# Patient Record
Sex: FEMALE | Race: WHITE | NOT HISPANIC OR LATINO | URBAN - METROPOLITAN AREA
[De-identification: names, ages, dates, MRNs, and addresses within clinical notes are randomized per-mention and may not be internally consistent; named-entity substitution may affect disease eponyms.]

---

## 2018-03-25 ENCOUNTER — EMERGENCY (EMERGENCY)
Facility: HOSPITAL | Age: 53
LOS: 1 days | Discharge: HOME | End: 2018-03-25
Attending: EMERGENCY MEDICINE | Admitting: EMERGENCY MEDICINE
Payer: COMMERCIAL

## 2018-03-25 VITALS
OXYGEN SATURATION: 100 % | HEART RATE: 72 BPM | RESPIRATION RATE: 16 BRPM | SYSTOLIC BLOOD PRESSURE: 125 MMHG | DIASTOLIC BLOOD PRESSURE: 85 MMHG | WEIGHT: 134.92 LBS | TEMPERATURE: 98 F

## 2018-03-25 DIAGNOSIS — Z98.890 OTHER SPECIFIED POSTPROCEDURAL STATES: Chronic | ICD-10-CM

## 2018-03-25 PROCEDURE — 99282 EMERGENCY DEPT VISIT SF MDM: CPT

## 2018-03-25 NOTE — ED ADULT TRIAGE NOTE - CHIEF COMPLAINT QUOTE
Patient c/o foreign body in throat with associated throat pain, worsening when swallowing. States she was eating popcorn Wednesday, had a popcorn stuck in throat. Has tried multiple manoeuvres to retrieve popcorn particle without relief. Denies SOB, trouble breathing, N/V, drooling. Airway patent, speaking in full sentences.

## 2018-03-25 NOTE — ED PROVIDER NOTE - OBJECTIVE STATEMENT
53 y/o F s/p tonsillectomy presents w/ foreign body in throat x 1 week after eating popcorn. Patient self examined her throat with no foreign body seen, though every time she swallows, patient has a sensation that a popcorn kernel is in her throat. Patient denies fever, chills, difficulty swallowing, difficulty breathing, drooling, or any other complaints.

## 2018-03-25 NOTE — ED ADULT NURSE NOTE - OBJECTIVE STATEMENT
throat pain x 5 days, thinks a piece of popcorn is tuck in throat, no resp distress noted. awaiting MD cameron

## 2018-03-25 NOTE — ED PROVIDER NOTE - ENMT, MLM
Airway patent, Nasal mucosa clear. Mouth with normal mucosa. Throat has no vesicles, no oropharyngeal exudates and uvula is midline. Airway patent, Nasal mucosa clear. Mouth with normal mucosa. No foreign body visualized, pharynx symmetric

## 2018-03-25 NOTE — ED PROVIDER NOTE - MEDICAL DECISION MAKING DETAILS
Patient had pop corn on Weds and feels fb in L pharynx since then, no SOB, no trouble swallowing.  On direct laryngoscopy no fb noted.  Pt referred to ENT.
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
## 2018-03-29 DIAGNOSIS — T17.208A UNSPECIFIED FOREIGN BODY IN PHARYNX CAUSING OTHER INJURY, INITIAL ENCOUNTER: ICD-10-CM
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
## 2018-07-26 NOTE — ED ADULT TRIAGE NOTE - TEMPERATURE IN CELSIUS (DEGREES C)
----- Message from Viraj Grijalva MD sent at 5/16/2017 12:58 PM CDT -----  Positive for lung nodules as above, likely benign, refer patient to pulmonary medicine for further evaluation and treatment   I received as request to schedule [facility] for a genetic counseling Visit. Patient has Oculocutaneous albinism. Biologic mother and non-biologic father desire counseling on risk of their child having similar condition. I left a message for this family with my direct contact information for a return call to schedule at their convenience.    36.7

## 2018-09-21 NOTE — ED ADULT TRIAGE NOTE - SOURCE OF INFORMATION
CBC normal, no anemia or sign of infection   CMP wnl, no kidney, liver sugar or electrolyte problems identified - only bilirubin mildly elevated, nonspecific and will observe  Lipase wnl  TSH wnl, no evidence of thyroid disease Patient

## 2019-08-02 ENCOUNTER — EMERGENCY (EMERGENCY)
Facility: HOSPITAL | Age: 54
LOS: 1 days | Discharge: HOME | End: 2019-08-02
Attending: EMERGENCY MEDICINE | Admitting: EMERGENCY MEDICINE
Payer: COMMERCIAL

## 2019-08-02 VITALS
TEMPERATURE: 98 F | HEIGHT: 64 IN | RESPIRATION RATE: 18 BRPM | DIASTOLIC BLOOD PRESSURE: 71 MMHG | SYSTOLIC BLOOD PRESSURE: 105 MMHG | HEART RATE: 65 BPM | OXYGEN SATURATION: 98 % | WEIGHT: 145.06 LBS

## 2019-08-02 DIAGNOSIS — Z98.890 OTHER SPECIFIED POSTPROCEDURAL STATES: Chronic | ICD-10-CM

## 2019-08-02 PROCEDURE — 73630 X-RAY EXAM OF FOOT: CPT | Mod: 26,RT

## 2019-08-02 PROCEDURE — 99283 EMERGENCY DEPT VISIT LOW MDM: CPT

## 2019-08-02 RX ORDER — ACETAMINOPHEN 500 MG
650 TABLET ORAL ONCE
Refills: 0 | Status: COMPLETED | OUTPATIENT
Start: 2019-08-02 | End: 2019-08-02

## 2019-08-02 RX ADMIN — Medication 650 MILLIGRAM(S): at 10:47

## 2019-08-02 NOTE — ED ADULT NURSE NOTE - NSIMPLEMENTINTERV_GEN_ALL_ED
----- Message from Anushka Alvarez sent at 2/19/2018  5:37 PM CST -----  Contact: Pt can be reached at [phone]  Pt is returning Nurse Ally  Call. Please give him a call back      Thank you!   Implemented All Universal Safety Interventions:  [place] to call system. Call bell, personal items and telephone within reach. Instruct patient to call for assistance. Room bathroom lighting operational. Non-slip footwear when patient is off stretcher. Physically safe environment: no spills, clutter or unnecessary equipment. Stretcher in lowest position, wheels locked, appropriate side rails in place.

## 2019-08-02 NOTE — ED ADULT NURSE NOTE - CHPI ED NUR SYMPTOMS NEG
no fever/no back pain/no stiffness/no tingling/no weakness/no numbness/no deformity/no bruising/no abrasion/no difficulty bearing weight

## 2019-08-02 NOTE — ED PROVIDER NOTE - NSFOLLOWUPINSTRUCTIONS_ED_ALL_ED_FT
CONTINUE TAKING NAPROXEN OR IBUPROFEN FOR INFLAMMATION AND PAIN AT HOME  TAKE TYLENOL ALTERNATING OR AT THE SAME TIME AS WELL    ICING YOUR FOOT CAN HELP DECREASED INFLAMMATION AND PAIN AS WELL    AVOID STRENUOUS EXERCISE LIKE RUNNING OR JUMPING FOR THE NEXT WEEK TO AVOID RE-STRAINING YOUR FOOT    FOLLOW UP WITH YOUR ORTHOPEDIST IF YOU CONTINUE TO HAVE PAIN OVER THE NEXT WEEK OR TWO DESPITE REST AND ANTI-INFLAMMATORY MEDICATION

## 2019-08-02 NOTE — ED PROVIDER NOTE - CLINICAL SUMMARY MEDICAL DECISION MAKING FREE TEXT BOX
XR to r/o stress fx, pt likely has sprain of metatarsal joints, will advise NSAIDs and tylenol for pain, orthopedic f/u, RICE precautions -Ryan

## 2019-08-02 NOTE — ED PROVIDER NOTE - OBJECTIVE STATEMENT
54y f no sig PMhx p/w R foot pain since yesterday; pt first noticed it yesterday morning, and then was doing jump-rope exercises at the gym when the pain became acutely worse. Pt has pain to palpation over the 3-4th metatarsal of her R foot. Pt able to ambulate but with pain. No significant trauma to the foot although pt does report frequent exercise that includes running and jumping.

## 2019-08-02 NOTE — ED PROVIDER NOTE - CARE PLAN
Principal Discharge DX:	Foot pain, right Principal Discharge DX:	Foot sprain, right, initial encounter

## 2019-08-06 NOTE — ED POST DISCHARGE NOTE - DETAILS
The patient was left a message with the care coordinators contact information regarding her xray results This writer spoke to the patient after her identity was confirmed  and provided her with her xray results

## 2019-08-08 DIAGNOSIS — M79.671 PAIN IN RIGHT FOOT: ICD-10-CM
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
## 2019-08-08 DIAGNOSIS — S93.601A UNSPECIFIED SPRAIN OF RIGHT FOOT, INITIAL ENCOUNTER: ICD-10-CM
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
## 2020-11-21 ENCOUNTER — EMERGENCY (EMERGENCY)
Facility: HOSPITAL | Age: 55
LOS: 1 days | Discharge: HOME | End: 2020-11-21
Admitting: EMERGENCY MEDICINE
Payer: COMMERCIAL

## 2020-11-21 VITALS
DIASTOLIC BLOOD PRESSURE: 86 MMHG | SYSTOLIC BLOOD PRESSURE: 127 MMHG | RESPIRATION RATE: 17 BRPM | OXYGEN SATURATION: 98 % | HEART RATE: 81 BPM | WEIGHT: 134.92 LBS | TEMPERATURE: 98 F | HEIGHT: 64 IN

## 2020-11-21 DIAGNOSIS — Z20.828 CONTACT WITH AND (SUSPECTED) EXPOSURE TO OTHER VIRAL COMMUNICABLE DISEASES: ICD-10-CM

## 2020-11-21 DIAGNOSIS — Z98.890 OTHER SPECIFIED POSTPROCEDURAL STATES: Chronic | ICD-10-CM

## 2020-11-21 PROCEDURE — 99283 EMERGENCY DEPT VISIT LOW MDM: CPT

## 2020-11-21 NOTE — ED PROVIDER NOTE - PHYSICAL EXAMINATION
GEN: Well appearing, well nourished, in no apparent distress.  ENT: Airway patent.  EYES: Clear bilaterally.  RESPIRATORY: Breathing comfortably with no distress.  MSK: Ambulatory.  NEURO: Awake, alert, coherent, clear speech.   PSYCH: Appropriate.

## 2020-11-21 NOTE — ED PROVIDER NOTE - PATIENT PORTAL LINK FT
You can access the FollowMyHealth Patient Portal offered by [facility] by registering at the following website: [email or web address]. By joining [facility]’s FollowMyHealth portal, you will also be able to view your health information using other applications (apps) compatible with our system.

## 2020-11-21 NOTE — ED PROVIDER NOTE - OBJECTIVE STATEMENT
56 y/o female presents to the ED requesting COVID-19 testing. Patient is traveling to [place] on Tuesday. Patient is currently asymptomatic and has no other medical complaints at this time. Denies fever, chills, chest pain, SOB, cough. 54 y/o female presents to the ED requesting COVID-19 testing. Patient is currently asymptomatic and has no other medical complaints at this time. Denies fever, chills, chest pain, SOB, cough.

## 2020-11-21 NOTE — ED PROVIDER NOTE - CLINICAL SUMMARY MEDICAL DECISION MAKING FREE TEXT BOX
Patient requesting testing for COVID-19. Patient appears well and in no apparent distress. Currently asymptomatic. Nasopharyngeal PCR has been obtained, and Patient has been guided on how to obtain their results. General COVID-19 discharge instructions have been given to the Patient. Patient agrees to receiving results electronically.

## 2020-11-21 NOTE — ED PROVIDER NOTE - NSFOLLOWUPINSTRUCTIONS_ED_ALL_ED_FT
You will receive your results electronically.    Return to the Emergency Departments for any concerns.

## 2021-03-18 ENCOUNTER — APPOINTMENT (OUTPATIENT)
Age: 56
End: 2021-03-18

## 2022-01-05 ENCOUNTER — RESULT REVIEW (OUTPATIENT)
Age: 57
End: 2022-01-05

## 2024-03-03 NOTE — ED ADULT NURSE NOTE - PT NEEDS ASSIST
[facility] EMERGENCY ROOM  [address] [place] [address]  Phone: [phone]  Fax: [phone]    Nelsy Steele was seen and treated in our emergency department on 3/3/2024.  She may return to school on 03/05/2024.          Thank you for choosing [facility].    Lizzeth Cook APRN no